# Patient Record
Sex: FEMALE | Race: WHITE | Employment: PART TIME | URBAN - METROPOLITAN AREA
[De-identification: names, ages, dates, MRNs, and addresses within clinical notes are randomized per-mention and may not be internally consistent; named-entity substitution may affect disease eponyms.]

---

## 2017-04-04 PROCEDURE — 82607 VITAMIN B-12: CPT | Performed by: INTERNAL MEDICINE

## 2017-05-11 ENCOUNTER — TELEPHONE (OUTPATIENT)
Dept: INTERNAL MEDICINE CLINIC | Facility: CLINIC | Age: 27
End: 2017-05-11

## 2017-05-12 NOTE — TELEPHONE ENCOUNTER
Lisa Lozada, do you know an answer to this?  I would think if he is, then she would be, but I dont want to make an assumption

## 2017-05-17 NOTE — TELEPHONE ENCOUNTER
Clint Kearns is a mid level and isn't a PROVIDER. DR. Carlso Friend is contracted with [facility]. We have requested that the vonnie change the billing provider to Dr. Carlos Friend and the service is [employer].     I am sending info in an email to be sure that we bill [facility]

## 2017-06-05 ENCOUNTER — TELEPHONE (OUTPATIENT)
Dept: FAMILY MEDICINE CLINIC | Facility: CLINIC | Age: 27
End: 2017-06-05

## 2017-06-09 ENCOUNTER — OFFICE VISIT (OUTPATIENT)
Dept: INTERNAL MEDICINE CLINIC | Facility: CLINIC | Age: 27
End: 2017-06-09

## 2017-06-09 VITALS
DIASTOLIC BLOOD PRESSURE: 80 MMHG | RESPIRATION RATE: 16 BRPM | SYSTOLIC BLOOD PRESSURE: 136 MMHG | WEIGHT: 254 LBS | HEART RATE: 76 BPM | HEIGHT: 66 IN | BODY MASS INDEX: 40.82 KG/M2

## 2017-06-09 DIAGNOSIS — Z51.81 ENCOUNTER FOR THERAPEUTIC DRUG MONITORING: Primary | ICD-10-CM

## 2017-06-09 DIAGNOSIS — E66.01 MORBID OBESITY WITH BMI OF 40.0-44.9, ADULT (HCC): ICD-10-CM

## 2017-06-09 PROCEDURE — 99213 OFFICE O/P EST LOW 20 MIN: CPT | Performed by: NURSE PRACTITIONER

## 2017-06-09 PROCEDURE — 93000 ELECTROCARDIOGRAM COMPLETE: CPT | Performed by: NURSE PRACTITIONER

## 2017-06-09 RX ORDER — LACTOBACIL 2/BIFIDO 1/S.THERMO 450B CELL
1 PACKET (EA) ORAL DAILY
Qty: 60 CAPSULE | Refills: 2 | Status: SHIPPED | COMMUNITY
Start: 2017-06-09

## 2017-06-09 RX ORDER — PHENTERMINE HYDROCHLORIDE 37.5 MG/1
37.5 TABLET ORAL
Qty: 30 TABLET | Refills: 0 | Status: SHIPPED | OUTPATIENT
Start: 2017-06-09 | End: 2017-07-11

## 2017-06-09 NOTE — PROGRESS NOTES
CC: Patient presents with:  Weight Problem: phentermine 2 years ago lost 11 pounds in 2 weeks then stopped to get pregnancy. exercise 3 days weekly currently       HPI:    Obesity, Feels that she has always been overweight and struggled with weight.  Had lo management of mother, antepartum     35 weeks gestation of pregnancy     Previous  delivery, delivered          Past Surgical History    TONSILLECTOMY        ,      Family History   Problem Relation Age of Onset   • Thyroid dis Prescriptions Disp Refills    Probiotic Product (VSL#3) Oral Cap 60 capsule 2      Sig: Take 1 capsule by mouth daily.       Phentermine HCl 37.5 MG Oral Tab 30 tablet 0      Sig: Take 1 tablet (37.5 mg total) by mouth every morning before breakfast.

## 2017-07-10 NOTE — ADDENDUM NOTE
Encounter addended by: Mekhi Reardon MA on: 7/10/2017  2:02 PM<BR>    Actions taken: Letter status changed

## 2017-07-11 ENCOUNTER — OFFICE VISIT (OUTPATIENT)
Dept: INTERNAL MEDICINE CLINIC | Facility: CLINIC | Age: 27
End: 2017-07-11

## 2017-07-11 VITALS
HEIGHT: 66 IN | DIASTOLIC BLOOD PRESSURE: 78 MMHG | SYSTOLIC BLOOD PRESSURE: 122 MMHG | RESPIRATION RATE: 16 BRPM | BODY MASS INDEX: 38.41 KG/M2 | HEART RATE: 80 BPM | WEIGHT: 239 LBS

## 2017-07-11 DIAGNOSIS — Z51.81 ENCOUNTER FOR THERAPEUTIC DRUG MONITORING: Primary | ICD-10-CM

## 2017-07-11 DIAGNOSIS — E66.01 MORBID OBESITY WITH BMI OF 40.0-44.9, ADULT (HCC): ICD-10-CM

## 2017-07-11 DIAGNOSIS — K59.03 DRUG-INDUCED CONSTIPATION: ICD-10-CM

## 2017-07-11 PROCEDURE — 99213 OFFICE O/P EST LOW 20 MIN: CPT | Performed by: NURSE PRACTITIONER

## 2017-07-11 RX ORDER — PHENTERMINE HYDROCHLORIDE 37.5 MG/1
37.5 TABLET ORAL
Qty: 30 TABLET | Refills: 0 | Status: SHIPPED | OUTPATIENT
Start: 2017-07-11 | End: 2017-08-08

## 2017-07-11 RX ORDER — LUBIPROSTONE 8 UG/1
8 CAPSULE, GELATIN COATED ORAL 2 TIMES DAILY WITH MEALS
Qty: 30 CAPSULE | Refills: 0 | COMMUNITY
Start: 2017-07-11 | End: 2018-05-01 | Stop reason: ALTCHOICE

## 2017-07-11 NOTE — PROGRESS NOTES
CC: Patient presents with:  Weight Check: lost 15 pounds       HPI:   Obesity. Patient tolerating phentermine but a lot of constipation associated with phentermine and she is taking VSL #3 and then was taking colace which did not really help.  Plans throat is clear, PERRLA  LUNGS: clear to auscultation bilat   CARDIO: RRR without murmur  GI: +BS's    No orders of the defined types were placed in this encounter.        Signed Prescriptions Disp Refills    Phentermine HCl 37.5 MG Oral Tab 30 tablet 0

## 2017-07-11 NOTE — PATIENT INSTRUCTIONS
1.  Keep up the good work  2. Reschedule with dietician and get labs done  3. Add metamucil daily for constipation,  If constipation continues try Miralax over the counter and make sure you are drinking lots of water and getting fiber in your diet.   Any ty · If you find yourself eating when you’re not hungry, ask yourself why. Many of us eat when we’re bored, stressed, or just to be polite. Listen to your body. If you’re not hungry, get busy doing something else instead of eating.   · Eat slower, shooting for

## 2017-07-31 PROCEDURE — 82397 CHEMILUMINESCENT ASSAY: CPT | Performed by: NURSE PRACTITIONER

## 2017-07-31 PROCEDURE — 36415 COLL VENOUS BLD VENIPUNCTURE: CPT | Performed by: NURSE PRACTITIONER

## 2017-08-08 ENCOUNTER — OFFICE VISIT (OUTPATIENT)
Dept: INTERNAL MEDICINE CLINIC | Facility: CLINIC | Age: 27
End: 2017-08-08

## 2017-08-08 VITALS
BODY MASS INDEX: 37.61 KG/M2 | HEART RATE: 88 BPM | RESPIRATION RATE: 16 BRPM | WEIGHT: 234 LBS | DIASTOLIC BLOOD PRESSURE: 70 MMHG | SYSTOLIC BLOOD PRESSURE: 110 MMHG | HEIGHT: 66 IN

## 2017-08-08 DIAGNOSIS — Z51.81 ENCOUNTER FOR THERAPEUTIC DRUG MONITORING: ICD-10-CM

## 2017-08-08 DIAGNOSIS — E66.01 MORBID OBESITY WITH BMI OF 40.0-44.9, ADULT (HCC): ICD-10-CM

## 2017-08-08 PROCEDURE — 99213 OFFICE O/P EST LOW 20 MIN: CPT | Performed by: NURSE PRACTITIONER

## 2017-08-08 RX ORDER — PHENTERMINE HYDROCHLORIDE 37.5 MG/1
37.5 TABLET ORAL
Qty: 30 TABLET | Refills: 0 | Status: SHIPPED | OUTPATIENT
Start: 2017-08-08 | End: 2017-09-05

## 2017-08-08 RX ORDER — TOPIRAMATE 25 MG/1
25 TABLET ORAL 2 TIMES DAILY
Qty: 60 TABLET | Refills: 1 | Status: SHIPPED | OUTPATIENT
Start: 2017-08-08 | End: 2017-10-10

## 2017-08-08 NOTE — PROGRESS NOTES
CC: Patient presents with:  Weight Check: lost 5 pounds       HPI:   Obesity. Patient tolerating phentermine better and metamucil and miralax has helped. She felt a bit more hungrier this month later in day.        Current Outpatient Prescriptions: RRR without murmur  GI: +BS's    No orders of the defined types were placed in this encounter.        Signed Prescriptions Disp Refills    Phentermine HCl 37.5 MG Oral Tab 30 tablet 0      Sig: Take 1 tablet (37.5 mg total) by mouth every morning before luna

## 2017-08-08 NOTE — PATIENT INSTRUCTIONS
Great job on exercise and is meeting with Rasta Greenberg RD      Weight Management: Fact and Fiction  Knowing the truth about losing weight can help you separate what works from what doesn’t.  Don’t be taken in by expensive weight-loss fads like pills, herbs, Fiction: “Low-fat and fat-free mean low-calorie.”  Fact: All foods, even fat-free ones, have calories. Eat too many calories and you’ll gain weight. It’s OK to treat yourself to a fat-free cookie or two. Just don’t eat the whole box!  A dietitian will help

## 2017-08-17 ENCOUNTER — OFFICE VISIT (OUTPATIENT)
Dept: INTERNAL MEDICINE CLINIC | Facility: CLINIC | Age: 27
End: 2017-08-17

## 2017-08-17 DIAGNOSIS — E66.9 OBESITY, CLASS II, BMI 35-39.9: ICD-10-CM

## 2017-08-17 PROCEDURE — 97802 MEDICAL NUTRITION INDIV IN: CPT | Performed by: DIETITIAN, REGISTERED

## 2017-08-20 VITALS — BODY MASS INDEX: 37 KG/M2 | WEIGHT: 232 LBS

## 2017-08-20 NOTE — PROGRESS NOTES
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   ----------    AST   Date Value Ref Range Status   04/04/2017 12 (L) 15 - 41 U/L Final   ----------    ALT   Date Value Ref Range Status   04/0 as bread and pasta. She had eaten in the past several months and noted rapid weight gain. The role of carbohydrate in foods replenishing muscle glycogen, with resulting water weight gain was discussed.    Emphasis was put on the goal of weight loss is to

## 2017-09-05 ENCOUNTER — OFFICE VISIT (OUTPATIENT)
Dept: INTERNAL MEDICINE CLINIC | Facility: CLINIC | Age: 27
End: 2017-09-05

## 2017-09-05 VITALS
SYSTOLIC BLOOD PRESSURE: 120 MMHG | DIASTOLIC BLOOD PRESSURE: 78 MMHG | HEART RATE: 80 BPM | WEIGHT: 224 LBS | HEIGHT: 66 IN | BODY MASS INDEX: 36 KG/M2 | RESPIRATION RATE: 16 BRPM

## 2017-09-05 DIAGNOSIS — E66.01 MORBID OBESITY WITH BMI OF 40.0-44.9, ADULT (HCC): ICD-10-CM

## 2017-09-05 DIAGNOSIS — Z51.81 ENCOUNTER FOR THERAPEUTIC DRUG MONITORING: ICD-10-CM

## 2017-09-05 PROCEDURE — 99213 OFFICE O/P EST LOW 20 MIN: CPT | Performed by: NURSE PRACTITIONER

## 2017-09-05 RX ORDER — PHENTERMINE HYDROCHLORIDE 37.5 MG/1
37.5 TABLET ORAL
Qty: 30 TABLET | Refills: 0 | Status: SHIPPED | OUTPATIENT
Start: 2017-09-05 | End: 2017-10-10

## 2017-09-05 NOTE — PATIENT INSTRUCTIONS
Weight Management: Overcoming Your Barriers  You may have many reasons why you’re not ready to lose weight. You may not feel you have the time or the skills. You may be afraid of losing weight and gaining it back again. Well, you can lose weight.  And y healthcare provider or dietitian about methods to lose weight that are safe for you. For example, even if you have severe arthritis, it may be easier for you to exercise in a pool.  Get advice from a .    Date Last Reviewed: 2/2/2016  ©

## 2017-09-05 NOTE — PROGRESS NOTES
CC: Patient presents with:  Weight Check: lost 10 pounds       HPI:   Obesity. Patient tolerating phentermine and addition of topamax has really helped with cravings. She has also begun to exercise more regularly.        Current Outpatient Prescript orders of the defined types were placed in this encounter.        Signed Prescriptions Disp Refills    Phentermine HCl 37.5 MG Oral Tab 30 tablet 0      Sig: Take 1 tablet (37.5 mg total) by mouth every morning before breakfast.          None     ASSESSMENT

## 2017-10-10 ENCOUNTER — OFFICE VISIT (OUTPATIENT)
Dept: INTERNAL MEDICINE CLINIC | Facility: CLINIC | Age: 27
End: 2017-10-10

## 2017-10-10 VITALS
DIASTOLIC BLOOD PRESSURE: 74 MMHG | BODY MASS INDEX: 34.87 KG/M2 | SYSTOLIC BLOOD PRESSURE: 132 MMHG | WEIGHT: 217 LBS | HEIGHT: 66 IN | RESPIRATION RATE: 16 BRPM | HEART RATE: 88 BPM

## 2017-10-10 DIAGNOSIS — Z51.81 ENCOUNTER FOR THERAPEUTIC DRUG MONITORING: ICD-10-CM

## 2017-10-10 DIAGNOSIS — E66.01 MORBID OBESITY WITH BMI OF 40.0-44.9, ADULT (HCC): ICD-10-CM

## 2017-10-10 PROCEDURE — 99213 OFFICE O/P EST LOW 20 MIN: CPT | Performed by: NURSE PRACTITIONER

## 2017-10-10 RX ORDER — PHENTERMINE HYDROCHLORIDE 37.5 MG/1
37.5 TABLET ORAL
Qty: 30 TABLET | Refills: 0 | Status: SHIPPED | OUTPATIENT
Start: 2017-10-10 | End: 2017-11-09

## 2017-10-10 RX ORDER — TOPIRAMATE 25 MG/1
25 TABLET ORAL 2 TIMES DAILY
Qty: 60 TABLET | Refills: 2 | Status: SHIPPED | OUTPATIENT
Start: 2017-10-10 | End: 2017-12-07

## 2017-10-10 NOTE — PATIENT INSTRUCTIONS
Healthy Tips for Eating Out  You don't have to give up eating out to cut down on fat, cholesterol, and salt. You just need to think about what you order. Many menus highlight low-fat and low-sodium dishes. But if you can't find what you want, ask.  Darryle Bowers

## 2017-10-10 NOTE — PROGRESS NOTES
CC: Patient presents with:  Weight Check: lost 7 pounds       HPI:   Obesity. Patient tolerating phentermine and  topamax without any problems and good appetite control. She continues to exercise more regularly.        Current Outpatient Prescriptio murmur  GI: +BS's    No orders of the defined types were placed in this encounter.        Signed Prescriptions Disp Refills    Phentermine HCl 37.5 MG Oral Tab 30 tablet 0      Sig: Take 1 tablet (37.5 mg total) by mouth every morning before breakfast.

## 2017-11-09 ENCOUNTER — OFFICE VISIT (OUTPATIENT)
Dept: INTERNAL MEDICINE CLINIC | Facility: CLINIC | Age: 27
End: 2017-11-09

## 2017-11-09 VITALS
HEIGHT: 66 IN | WEIGHT: 212 LBS | SYSTOLIC BLOOD PRESSURE: 102 MMHG | HEART RATE: 80 BPM | BODY MASS INDEX: 34.07 KG/M2 | RESPIRATION RATE: 16 BRPM | DIASTOLIC BLOOD PRESSURE: 80 MMHG

## 2017-11-09 DIAGNOSIS — Z51.81 ENCOUNTER FOR THERAPEUTIC DRUG MONITORING: ICD-10-CM

## 2017-11-09 DIAGNOSIS — E66.01 MORBID OBESITY WITH BMI OF 40.0-44.9, ADULT (HCC): ICD-10-CM

## 2017-11-09 PROCEDURE — 99213 OFFICE O/P EST LOW 20 MIN: CPT | Performed by: NURSE PRACTITIONER

## 2017-11-09 RX ORDER — PHENTERMINE HYDROCHLORIDE 37.5 MG/1
37.5 TABLET ORAL
Qty: 30 TABLET | Refills: 0 | Status: SHIPPED | OUTPATIENT
Start: 2017-11-09 | End: 2017-12-07

## 2017-11-09 NOTE — PROGRESS NOTES
CC: Patient presents with:  Weight Check: lost 6 pounds       HPI:   Obesity. Patient tolerating phentermine and topamax without any problems and good appetite control.         Current Outpatient Prescriptions:  Phentermine HCl 37.5 MG Oral Tab Take in this encounter.        Signed Prescriptions Disp Refills    Phentermine HCl 37.5 MG Oral Tab 30 tablet 0      Sig: Take 1 tablet (37.5 mg total) by mouth every morning before breakfast.          None     ASSESSMENT:   Encounter for therapeutic drug monit

## 2017-11-09 NOTE — PATIENT INSTRUCTIONS
Diabetes: Learning About Serving and Portion Sizes     A good rule of thumb: Devote half your plate to vegetables and green salad. Split the other half between protein and starchy carbohydrates. Fruit makes a good dessert. Servings and portions.  What When you’re planning for a snack or a meal, keep servings in mind. If you don’t have measuring cups or a scale handy, there are ways to [facility] serving sizes, such as comparing your food to the size of your hand (see pictures above).   Managing portion si

## 2017-12-07 ENCOUNTER — OFFICE VISIT (OUTPATIENT)
Dept: INTERNAL MEDICINE CLINIC | Facility: CLINIC | Age: 27
End: 2017-12-07

## 2017-12-07 VITALS
HEART RATE: 80 BPM | RESPIRATION RATE: 16 BRPM | HEIGHT: 66 IN | SYSTOLIC BLOOD PRESSURE: 112 MMHG | BODY MASS INDEX: 33.27 KG/M2 | DIASTOLIC BLOOD PRESSURE: 70 MMHG | WEIGHT: 207 LBS

## 2017-12-07 DIAGNOSIS — E66.01 MORBID OBESITY WITH BMI OF 40.0-44.9, ADULT (HCC): ICD-10-CM

## 2017-12-07 DIAGNOSIS — Z51.81 ENCOUNTER FOR THERAPEUTIC DRUG MONITORING: ICD-10-CM

## 2017-12-07 PROCEDURE — 99213 OFFICE O/P EST LOW 20 MIN: CPT | Performed by: NURSE PRACTITIONER

## 2017-12-07 RX ORDER — PHENTERMINE HYDROCHLORIDE 37.5 MG/1
37.5 TABLET ORAL
Qty: 30 TABLET | Refills: 0 | Status: SHIPPED | OUTPATIENT
Start: 2017-12-07 | End: 2018-01-06

## 2017-12-07 RX ORDER — TOPIRAMATE 25 MG/1
25 TABLET ORAL 2 TIMES DAILY
Qty: 60 TABLET | Refills: 2 | Status: SHIPPED | OUTPATIENT
Start: 2017-12-07 | End: 2018-03-06

## 2017-12-07 NOTE — PROGRESS NOTES
CC: Patient presents with:  Weight Check: lost 5 pounds       HPI:   Obesity. Patient doing fine on phentermine and topamax without any problems and good appetite control.  She has not been exercising as much as preparing for [place] as hosting at encounter.        Signed Prescriptions Disp Refills    Phentermine HCl 37.5 MG Oral Tab 30 tablet 0      Sig: Take 1 tablet (37.5 mg total) by mouth every morning before breakfast.      topiramate 25 MG Oral Tab 60 tablet 2      Sig: Take 1 tablet (25 mg to

## 2017-12-07 NOTE — PATIENT INSTRUCTIONS
Weight Management: Take It Off and Keep It Off    It’s easy to be motivated when you first start. The key is to stay motivated all along the way and to have realistic and achievable goals.  There are things you can do to keep yourself on the path to succe · Make a list of the things that others like about you and that you like about yourself. Add something new from time to time. Keep this list to look at when you need a lift. Resources  · [employer] Nutritionwww. fitness. go

## 2018-02-01 ENCOUNTER — OFFICE VISIT (OUTPATIENT)
Dept: INTERNAL MEDICINE CLINIC | Facility: CLINIC | Age: 28
End: 2018-02-01

## 2018-02-01 VITALS
DIASTOLIC BLOOD PRESSURE: 78 MMHG | SYSTOLIC BLOOD PRESSURE: 122 MMHG | WEIGHT: 201 LBS | HEART RATE: 80 BPM | HEIGHT: 66 IN | RESPIRATION RATE: 16 BRPM | BODY MASS INDEX: 32.3 KG/M2

## 2018-02-01 DIAGNOSIS — E66.9 CLASS 1 OBESITY WITHOUT SERIOUS COMORBIDITY WITH BODY MASS INDEX (BMI) OF 32.0 TO 32.9 IN ADULT, UNSPECIFIED OBESITY TYPE: ICD-10-CM

## 2018-02-01 DIAGNOSIS — Z51.81 THERAPEUTIC DRUG MONITORING: Primary | ICD-10-CM

## 2018-02-01 PROCEDURE — 99213 OFFICE O/P EST LOW 20 MIN: CPT | Performed by: INTERNAL MEDICINE

## 2018-02-01 RX ORDER — PHENTERMINE HYDROCHLORIDE 37.5 MG/1
37.5 TABLET ORAL
COMMUNITY
End: 2018-02-01

## 2018-02-01 RX ORDER — PHENTERMINE HYDROCHLORIDE 37.5 MG/1
37.5 TABLET ORAL
Qty: 30 TABLET | Refills: 0 | Status: SHIPPED | OUTPATIENT
Start: 2018-02-01 | End: 2018-04-03

## 2018-02-01 NOTE — PROGRESS NOTES
Jordan Blakely is a 32year old female.     Chief complaint:weight loss follow up     HPI:   Perlie Claude here for follow up on weight loss   Wt Readings from Last 6 Encounters:  02/01/18 : 201 lb  12/07/17 : 207 lb  11/09/17 : 212 lb  10/10/17 : 217 lb  09/05 Alcohol use: Yes           0.0 oz/week     Comment: glass of wine a week        Family History   Problem Relation Age of Onset   • Thyroid disease Father    • Hypertension Father    • Breast Cancer Mother 64   • Pacemaker Maternal Grandmother    • Heart Medication History:  Continue half phentermine 37.5 mg daily continue topamax   Plan:  1. Nutrition: low carb diet   2. Referral RD/nutritionist   3. FITTE:  ACSM recommendations (150 -200 minutes/week in active weight loss)  4.  Behavior:  Motivational i

## 2018-03-06 DIAGNOSIS — E66.01 MORBID OBESITY WITH BMI OF 40.0-44.9, ADULT (HCC): ICD-10-CM

## 2018-03-06 DIAGNOSIS — Z51.81 ENCOUNTER FOR THERAPEUTIC DRUG MONITORING: ICD-10-CM

## 2018-03-06 RX ORDER — TOPIRAMATE 25 MG/1
25 TABLET ORAL 2 TIMES DAILY
Qty: 180 TABLET | Refills: 0 | Status: SHIPPED | OUTPATIENT
Start: 2018-03-06 | End: 2018-05-09

## 2018-03-06 NOTE — TELEPHONE ENCOUNTER
Megan patient    Requesting 90 day supply.  pending    Requesting topamax  LOV: 12/7/17  RTC: 4 weeks  Filled: 12/7/17 #60 with 2 refills    Future Appointments  Date Time Provider Gal Rodríguez   3/8/2018 11:40 AM [facility]   4/3

## 2018-04-03 ENCOUNTER — OFFICE VISIT (OUTPATIENT)
Dept: INTERNAL MEDICINE CLINIC | Facility: CLINIC | Age: 28
End: 2018-04-03

## 2018-04-03 VITALS
HEIGHT: 66 IN | HEART RATE: 84 BPM | RESPIRATION RATE: 16 BRPM | BODY MASS INDEX: 31.5 KG/M2 | SYSTOLIC BLOOD PRESSURE: 110 MMHG | DIASTOLIC BLOOD PRESSURE: 60 MMHG | WEIGHT: 196 LBS

## 2018-04-03 DIAGNOSIS — Z51.81 THERAPEUTIC DRUG MONITORING: Primary | ICD-10-CM

## 2018-04-03 DIAGNOSIS — E66.9 CLASS II OBESITY: ICD-10-CM

## 2018-04-03 PROCEDURE — 99213 OFFICE O/P EST LOW 20 MIN: CPT | Performed by: INTERNAL MEDICINE

## 2018-04-03 RX ORDER — PHENTERMINE HYDROCHLORIDE 37.5 MG/1
TABLET ORAL
Qty: 30 TABLET | Refills: 0 | Status: SHIPPED | OUTPATIENT
Start: 2018-04-03 | End: 2018-07-02

## 2018-04-03 RX ORDER — PHENTERMINE HYDROCHLORIDE 37.5 MG/1
37.5 TABLET ORAL
Qty: 30 TABLET | Refills: 0 | Status: SHIPPED | OUTPATIENT
Start: 2018-04-03 | End: 2018-05-09

## 2018-04-03 NOTE — PROGRESS NOTES
HISTORY OF PRESENT ILLNESS  Patient presents with:  Weight Check: down [address] [facility] Michael Bowers is a 32year old female here for follow up in medical weight loss program.     Denies chest pain, shortness of breath, dizziness, blurred vision, headache, pares Value Date   CHOLEST 162 04/04/2017   TRIG 103 04/04/2017   HDL 39 (L) 04/04/2017    04/04/2017       Lab Results  Component Value Date   TSH 1.990 04/04/2017       Lab Results  Component Value Date   B12 868 04/04/2017       Lab Results  Component fitnessblender and fitsugar   · Weight Loss Contract reviewed and signed     There are no Patient Instructions on file for this visit. Return in about 4 weeks (around 5/1/2018) for weight mangement. Patient verbalizes understanding.     Allegra Villa MD

## 2018-05-01 ENCOUNTER — HOSPITAL ENCOUNTER (OUTPATIENT)
Age: 28
Discharge: HOME | End: 2018-05-01
Payer: COMMERCIAL

## 2018-05-01 VITALS
RESPIRATION RATE: 18 BRPM | SYSTOLIC BLOOD PRESSURE: 139 MMHG | TEMPERATURE: 99 F | DIASTOLIC BLOOD PRESSURE: 88 MMHG | OXYGEN SATURATION: 100 % | HEART RATE: 100 BPM

## 2018-05-01 DIAGNOSIS — J06.9 UPPER RESPIRATORY TRACT INFECTION, UNSPECIFIED TYPE: Primary | ICD-10-CM

## 2018-05-01 PROCEDURE — 99204 OFFICE O/P NEW MOD 45 MIN: CPT

## 2018-05-01 PROCEDURE — 99214 OFFICE O/P EST MOD 30 MIN: CPT

## 2018-05-01 PROCEDURE — 94640 AIRWAY INHALATION TREATMENT: CPT

## 2018-05-01 RX ORDER — ALBUTEROL SULFATE 90 UG/1
2 AEROSOL, METERED RESPIRATORY (INHALATION) EVERY 4 HOURS PRN
Qty: 1 INHALER | Refills: 0 | Status: SHIPPED | OUTPATIENT
Start: 2018-05-01 | End: 2018-05-31

## 2018-05-01 RX ORDER — IPRATROPIUM BROMIDE AND ALBUTEROL SULFATE 2.5; .5 MG/3ML; MG/3ML
3 SOLUTION RESPIRATORY (INHALATION) ONCE
Status: COMPLETED | OUTPATIENT
Start: 2018-05-01 | End: 2018-05-01

## 2018-05-01 RX ORDER — PREDNISONE 20 MG/1
40 TABLET ORAL DAILY
Qty: 10 TABLET | Refills: 0 | Status: SHIPPED | OUTPATIENT
Start: 2018-05-01 | End: 2018-05-06

## 2018-05-01 RX ORDER — AZITHROMYCIN 250 MG/1
TABLET, FILM COATED ORAL
Qty: 1 PACKAGE | Refills: 0 | Status: SHIPPED | OUTPATIENT
Start: 2018-05-01 | End: 2018-05-06

## 2018-05-01 NOTE — ED INITIAL ASSESSMENT (HPI)
C/O cough with wheezing and chest congestion started today, coughing-up thick yellow mucous. Taking Mucinex,Sudafed, Tylenol and Motrin.

## 2018-05-02 NOTE — ED PROVIDER NOTES
Patient Seen in: [facility]    History   Patient presents with:  Cough/URI    Stated Complaint: COUGH     HPI    47-year-old female who comes in today complaining of worsening upper respiratory complaints cough and wheezing that canals normal, both ears   Nose: Nares symmetrical, septum midline, mucosa normal, clear watery discharge; no sinus tenderness   Throat: Lips, tongue, and mucosa are moist, pink, and intact; teeth intact.  Mild erythema, no exudates or tonsillar hypertrophy into the lungs every 4 (four) hours as needed for Wheezing. Qty: 1 Inhaler Refills: 0      I have given the patient instructions regarding her diagnosis, expectations, follow up, and return to the ER precautions.   I explained to the patient that emergent

## 2018-05-04 ENCOUNTER — PATIENT MESSAGE (OUTPATIENT)
Dept: INTERNAL MEDICINE CLINIC | Facility: CLINIC | Age: 28
End: 2018-05-04

## 2018-05-04 RX ORDER — PHENTERMINE HYDROCHLORIDE 37.5 MG/1
37.5 TABLET ORAL
Qty: 30 TABLET | Refills: 0
Start: 2018-05-04

## 2018-05-04 NOTE — TELEPHONE ENCOUNTER
From: Casey Shelton  To: Edmund Gonzalez MD  Sent: 5/4/2018 9:14 AM CDT  Subject: Prescription Question    Dr Isai Slaughter, April has been a bad month. I have been off my medicine for 2 weeks now due to a bad respiratory illness.  I complete my steroids and antibiot

## 2018-05-04 NOTE — TELEPHONE ENCOUNTER
From: Mima Babb  Sent: 5/4/2018 9:11 AM CDT  Subject: Medication Renewal Request    Mima Babb would like a refill of the following medications:     Phentermine HCl 37.5 MG Oral Tab Zofia Barnett MD]   Patient Comment: Dr Gonzalez Rodriguez, April has been a

## 2018-05-09 ENCOUNTER — OFFICE VISIT (OUTPATIENT)
Dept: INTERNAL MEDICINE CLINIC | Facility: CLINIC | Age: 28
End: 2018-05-09

## 2018-05-09 VITALS
BODY MASS INDEX: 31.44 KG/M2 | HEIGHT: 66 IN | HEART RATE: 80 BPM | RESPIRATION RATE: 12 BRPM | WEIGHT: 195.63 LBS | DIASTOLIC BLOOD PRESSURE: 70 MMHG | SYSTOLIC BLOOD PRESSURE: 130 MMHG

## 2018-05-09 DIAGNOSIS — Z51.81 ENCOUNTER FOR THERAPEUTIC DRUG MONITORING: ICD-10-CM

## 2018-05-09 DIAGNOSIS — E66.01 MORBID OBESITY WITH BMI OF 40.0-44.9, ADULT (HCC): ICD-10-CM

## 2018-05-09 PROCEDURE — 99213 OFFICE O/P EST LOW 20 MIN: CPT | Performed by: INTERNAL MEDICINE

## 2018-05-09 RX ORDER — PHENTERMINE HYDROCHLORIDE 37.5 MG/1
37.5 TABLET ORAL
Qty: 30 TABLET | Refills: 0 | Status: SHIPPED | OUTPATIENT
Start: 2018-05-09 | End: 2018-07-02

## 2018-05-09 RX ORDER — TOPIRAMATE 25 MG/1
25 TABLET ORAL 2 TIMES DAILY
Qty: 180 TABLET | Refills: 0 | Status: SHIPPED | OUTPATIENT
Start: 2018-05-09 | End: 2018-08-02

## 2018-05-09 RX ORDER — PHENTERMINE HYDROCHLORIDE 37.5 MG/1
TABLET ORAL
Qty: 30 TABLET | Refills: 0 | Status: CANCELLED | OUTPATIENT
Start: 2018-05-09

## 2018-06-02 ENCOUNTER — PATIENT MESSAGE (OUTPATIENT)
Dept: INTERNAL MEDICINE CLINIC | Facility: CLINIC | Age: 28
End: 2018-06-02

## 2018-06-05 NOTE — TELEPHONE ENCOUNTER
From: Casey Shelton  To: Edmund Gonzalez MD  Sent: 6/2/2018 11:06 PM CDT  Subject: Visit Follow-up Question    Dr. Isai Slaughter, I had to cancel my Monday appointment due to an unexpected medical issue with my son, I apologize for the late cancellation.  The month i

## 2018-06-12 RX ORDER — TOPIRAMATE 25 MG/1
25 TABLET ORAL 2 TIMES DAILY
Qty: 180 TABLET | Refills: 0 | Status: SHIPPED | OUTPATIENT
Start: 2018-06-12 | End: 2018-07-02

## 2018-07-02 ENCOUNTER — OFFICE VISIT (OUTPATIENT)
Dept: INTERNAL MEDICINE CLINIC | Facility: CLINIC | Age: 28
End: 2018-07-02

## 2018-07-02 VITALS
HEIGHT: 66 IN | DIASTOLIC BLOOD PRESSURE: 70 MMHG | HEART RATE: 76 BPM | RESPIRATION RATE: 16 BRPM | SYSTOLIC BLOOD PRESSURE: 110 MMHG | BODY MASS INDEX: 30.7 KG/M2 | WEIGHT: 191 LBS

## 2018-07-02 DIAGNOSIS — E66.01 MORBID OBESITY WITH BMI OF 40.0-44.9, ADULT (HCC): ICD-10-CM

## 2018-07-02 DIAGNOSIS — Z51.81 THERAPEUTIC DRUG MONITORING: Primary | ICD-10-CM

## 2018-07-02 PROCEDURE — 99213 OFFICE O/P EST LOW 20 MIN: CPT | Performed by: INTERNAL MEDICINE

## 2018-07-02 RX ORDER — PHENTERMINE HYDROCHLORIDE 37.5 MG/1
37.5 TABLET ORAL
Qty: 30 TABLET | Refills: 0 | Status: SHIPPED | OUTPATIENT
Start: 2018-07-02 | End: 2018-08-02

## 2018-07-05 NOTE — PROGRESS NOTES
HISTORY OF PRESENT ILLNESS  Patient presents with:  Weight Check: down [address] David Goel is a 25year old female here for follow up in medical weight loss program.   Down 4 lbs this month  Feels like did much better job about being on track with her 1.990 04/04/2017       Lab Results  Component Value Date   B12 868 04/04/2017       Lab Results  Component Value Date   VITD 35.82 07/31/2017         Current Outpatient Prescriptions on File Prior to Visit:  topiramate 25 MG Oral Tab Take 1 tablet (25 mg t mangement. Patient verbalizes understanding.     Lacho Cody MD  4/3/2018

## 2018-08-02 ENCOUNTER — OFFICE VISIT (OUTPATIENT)
Dept: INTERNAL MEDICINE CLINIC | Facility: CLINIC | Age: 28
End: 2018-08-02
Payer: COMMERCIAL

## 2018-08-02 VITALS
HEIGHT: 66 IN | WEIGHT: 187 LBS | BODY MASS INDEX: 30.05 KG/M2 | HEART RATE: 80 BPM | RESPIRATION RATE: 16 BRPM | SYSTOLIC BLOOD PRESSURE: 122 MMHG | DIASTOLIC BLOOD PRESSURE: 64 MMHG

## 2018-08-02 DIAGNOSIS — Z51.81 ENCOUNTER FOR THERAPEUTIC DRUG MONITORING: ICD-10-CM

## 2018-08-02 DIAGNOSIS — Z51.81 THERAPEUTIC DRUG MONITORING: Primary | ICD-10-CM

## 2018-08-02 DIAGNOSIS — E66.01 MORBID OBESITY WITH BMI OF 40.0-44.9, ADULT (HCC): ICD-10-CM

## 2018-08-02 PROCEDURE — 99213 OFFICE O/P EST LOW 20 MIN: CPT | Performed by: INTERNAL MEDICINE

## 2018-08-02 RX ORDER — PHENTERMINE HYDROCHLORIDE 37.5 MG/1
37.5 TABLET ORAL
Qty: 30 TABLET | Refills: 0 | Status: SHIPPED | OUTPATIENT
Start: 2018-08-02 | End: 2018-09-04

## 2018-08-02 RX ORDER — TOPIRAMATE 25 MG/1
25 TABLET ORAL 2 TIMES DAILY
Qty: 180 TABLET | Refills: 0 | Status: SHIPPED | OUTPATIENT
Start: 2018-08-02 | End: 2018-09-04

## 2018-08-02 NOTE — PROGRESS NOTES
HISTORY OF PRESENT ILLNESS  Patient presents with:  Weight Check: down 4 lb      Jordan Blakely is a 25year old female here for follow up in medical weight loss program.   Down 4 lbs this month  Better month for health consistency   Denies any chest pam Results  Component Value Date   TSH 1.990 04/04/2017       Lab Results  Component Value Date   B12 868 04/04/2017       Lab Results  Component Value Date   VITD 35.82 07/31/2017         Current Outpatient Prescriptions on File Prior to Visit:  Probiotic Pr file for this visit. Return in about 4 weeks (around 8/30/2018) for weight management. Patient verbalizes understanding.     Falguni Montiel MD  4/3/2018

## 2018-09-04 ENCOUNTER — OFFICE VISIT (OUTPATIENT)
Dept: INTERNAL MEDICINE CLINIC | Facility: CLINIC | Age: 28
End: 2018-09-04

## 2018-09-04 VITALS
HEART RATE: 72 BPM | BODY MASS INDEX: 29.51 KG/M2 | SYSTOLIC BLOOD PRESSURE: 106 MMHG | RESPIRATION RATE: 16 BRPM | WEIGHT: 183.63 LBS | DIASTOLIC BLOOD PRESSURE: 80 MMHG | HEIGHT: 66 IN | TEMPERATURE: 98 F

## 2018-09-04 DIAGNOSIS — E66.01 MORBID OBESITY WITH BMI OF 40.0-44.9, ADULT (HCC): ICD-10-CM

## 2018-09-04 DIAGNOSIS — Z51.81 THERAPEUTIC DRUG MONITORING: Primary | ICD-10-CM

## 2018-09-04 PROCEDURE — 99213 OFFICE O/P EST LOW 20 MIN: CPT | Performed by: INTERNAL MEDICINE

## 2018-09-04 RX ORDER — PHENTERMINE HYDROCHLORIDE 37.5 MG/1
37.5 TABLET ORAL
Qty: 30 TABLET | Refills: 0 | Status: SHIPPED | OUTPATIENT
Start: 2018-09-04 | End: 2018-10-04

## 2018-09-04 NOTE — PROGRESS NOTES
HISTORY OF PRESENT ILLNESS  Patient presents with:  Weight Check: 4 lb weight loss - pt. stopped taking topamax due to rashes       Vibha Ellsworth is a 25year old female here for follow up in medical weight loss program.   Down 4 lbs this month  Better 04/04/2017   ALT 16 04/04/2017   BILT 0.58 04/04/2017   TP 7.2 04/04/2017   ALB 3.9 04/04/2017    04/04/2017   K 4.2 04/04/2017    04/04/2017   CO2 26.3 04/04/2017     No results found for: EAG, A1C    Lab Results  Component Value Date   CHOLES maintenance  · FITTE: ACSM recommendations (150-300 minutes/ week in active weight loss)  · Weight Loss Contract reviewed and signed     There are no Patient Instructions on file for this visit. No Follow-up on file. Patient verbalizes understanding.

## 2018-10-02 ENCOUNTER — TELEPHONE (OUTPATIENT)
Dept: INTERNAL MEDICINE CLINIC | Facility: CLINIC | Age: 28
End: 2018-10-02

## 2018-10-02 NOTE — TELEPHONE ENCOUNTER
Patient called back to r/s her appt that was booked for 10/10 pt booked for 10/29 but will be out of medication pt states she may also have to cancel the new appt made for 10/29 due to having to  her child from school pt will let us know     Pt adde

## 2018-10-04 RX ORDER — PHENTERMINE HYDROCHLORIDE 37.5 MG/1
37.5 TABLET ORAL
Qty: 30 TABLET | Refills: 0 | Status: SHIPPED | OUTPATIENT
Start: 2018-10-04 | End: 2019-01-14

## 2018-10-04 NOTE — TELEPHONE ENCOUNTER
Pt called to check status on rx refill   Offered pt appt for today - pt declined has to  child from school     Pt on list

## 2018-10-04 NOTE — TELEPHONE ENCOUNTER
Pt was scheduled to see you on 10/29 at 12;20 but that time will not work as she has to  her son, she said anytime after 1 pm will work out best. She said she did not cancel the appt on 10/10 our office did and then scheduled her for 10/29.  rx fax

## 2018-10-04 NOTE — TELEPHONE ENCOUNTER
Called and left message for pt to call back. Need to know which pharmacy she wants Rx for Phentermine to go to.

## 2018-10-04 NOTE — TELEPHONE ENCOUNTER
Requesting phentermine 37.5  LOV: 9/4/18  RTC: 1 mth  Last Relevant Labs:   Filled: 9/4/18 #30 with 0 refills    Future Appointments   Date Time Provider Gal Rodríguez   10/29/2018 12:20 PM John Lopez MD [facility]     Pt cancelled appt 10/10

## 2018-10-16 ENCOUNTER — TELEPHONE (OUTPATIENT)
Dept: INTERNAL MEDICINE CLINIC | Facility: CLINIC | Age: 28
End: 2018-10-16

## 2018-10-16 NOTE — TELEPHONE ENCOUNTER
Pt on waitlist called 3 times left vm has not returned call declined first offer on first call for oct 2

## 2018-11-15 ENCOUNTER — OFFICE VISIT (OUTPATIENT)
Dept: INTERNAL MEDICINE CLINIC | Facility: CLINIC | Age: 28
End: 2018-11-15
Payer: COMMERCIAL

## 2018-11-15 VITALS
HEART RATE: 88 BPM | HEIGHT: 66 IN | RESPIRATION RATE: 16 BRPM | WEIGHT: 183 LBS | DIASTOLIC BLOOD PRESSURE: 76 MMHG | SYSTOLIC BLOOD PRESSURE: 126 MMHG | BODY MASS INDEX: 29.41 KG/M2

## 2018-11-15 DIAGNOSIS — Z51.81 ENCOUNTER FOR THERAPEUTIC DRUG MONITORING: Primary | ICD-10-CM

## 2018-11-15 DIAGNOSIS — E66.01 MORBID OBESITY WITH BMI OF 40.0-44.9, ADULT (HCC): ICD-10-CM

## 2018-11-15 PROCEDURE — 99213 OFFICE O/P EST LOW 20 MIN: CPT | Performed by: INTERNAL MEDICINE

## 2018-11-15 RX ORDER — PHENTERMINE HYDROCHLORIDE 15 MG/1
15 CAPSULE ORAL EVERY MORNING
Qty: 30 CAPSULE | Refills: 1 | Status: SHIPPED | OUTPATIENT
Start: 2018-11-15 | End: 2019-02-11

## 2018-11-15 RX ORDER — PHENTERMINE HYDROCHLORIDE 37.5 MG/1
37.5 TABLET ORAL
Qty: 30 TABLET | Refills: 0 | Status: CANCELLED | OUTPATIENT
Start: 2018-11-15

## 2018-11-15 NOTE — PROGRESS NOTES
HISTORY OF PRESENT ILLNESS  Patient presents with:  Weight Check: no weight change      Teresa Turner is a 25year old female here for follow up in medical weight loss program.   Down 4 lbs this month  Better month for health consistency   Denies any ch 04/04/2017     No results found for: EAG, A1C  Lab Results   Component Value Date    CHOLEST 162 04/04/2017    TRIG 103 04/04/2017    HDL 39 (L) 04/04/2017     04/04/2017     Lab Results   Component Value Date    TSH 1.990 04/04/2017     Lab Results recommendations (150-300 minutes/ week in active weight loss)  · Weight Loss Contract reviewed and signed     There are no Patient Instructions on file for this visit. Return in about 4 weeks (around 12/13/2018). Patient verbalizes understanding.

## 2019-01-14 ENCOUNTER — OFFICE VISIT (OUTPATIENT)
Dept: INTERNAL MEDICINE CLINIC | Facility: CLINIC | Age: 29
End: 2019-01-14
Payer: COMMERCIAL

## 2019-01-14 VITALS
SYSTOLIC BLOOD PRESSURE: 122 MMHG | BODY MASS INDEX: 30.05 KG/M2 | DIASTOLIC BLOOD PRESSURE: 76 MMHG | RESPIRATION RATE: 16 BRPM | WEIGHT: 187 LBS | HEART RATE: 78 BPM | HEIGHT: 66 IN

## 2019-01-14 DIAGNOSIS — Z51.81 ENCOUNTER FOR THERAPEUTIC DRUG MONITORING: Primary | ICD-10-CM

## 2019-01-14 DIAGNOSIS — E66.01 MORBID OBESITY WITH BMI OF 40.0-44.9, ADULT (HCC): ICD-10-CM

## 2019-01-14 PROCEDURE — 99213 OFFICE O/P EST LOW 20 MIN: CPT | Performed by: INTERNAL MEDICINE

## 2019-01-14 RX ORDER — PHENTERMINE HYDROCHLORIDE 37.5 MG/1
37.5 TABLET ORAL
Qty: 30 TABLET | Refills: 0 | Status: SHIPPED | OUTPATIENT
Start: 2019-01-14 | End: 2019-02-11

## 2019-01-14 NOTE — PROGRESS NOTES
HISTORY OF PRESENT ILLNESS  Patient presents with:  Weight Check: up 4 lb      Maribell Soco is a 25year old female here for follow up in medical weight loss program.   Up 4 lbs this month  Better month for health consistency   Denies any chest pain or 04/04/2017     04/04/2017     Lab Results   Component Value Date    TSH 1.990 04/04/2017     Lab Results   Component Value Date    B12 868 04/04/2017     Lab Results   Component Value Date    VITD 35.82 07/31/2017         Current Outpatient Jw Albrecht visit. Return in about 4 weeks (around 2/11/2019) for weight management. Patient verbalizes understanding.     Jennifer Mayberry MD  4/3/2018

## 2019-02-06 ENCOUNTER — TELEPHONE (OUTPATIENT)
Dept: INTERNAL MEDICINE CLINIC | Facility: CLINIC | Age: 29
End: 2019-02-06

## 2019-02-06 DIAGNOSIS — E66.01 MORBID OBESITY WITH BMI OF 40.0-44.9, ADULT (HCC): ICD-10-CM

## 2019-02-06 DIAGNOSIS — Z51.81 ENCOUNTER FOR THERAPEUTIC DRUG MONITORING: ICD-10-CM

## 2019-02-06 RX ORDER — TOPIRAMATE 25 MG/1
25 TABLET ORAL 2 TIMES DAILY
Qty: 180 TABLET | Refills: 0 | OUTPATIENT
Start: 2019-02-06 | End: 2019-05-07

## 2019-02-06 NOTE — TELEPHONE ENCOUNTER
Requesting Topiramate  LOV: 1/14/19  RTC: 4 weeks  Last Relevant Labs: n/a  Filled: 8/2/18 #180 with 0 refills    Future Appointments   Date Time Provider Gal Rodríguez   2/11/2019  1:00 PM Clay Mccullough MD [facility]     Pts topamax was stopped

## 2019-02-11 ENCOUNTER — OFFICE VISIT (OUTPATIENT)
Dept: INTERNAL MEDICINE CLINIC | Facility: CLINIC | Age: 29
End: 2019-02-11
Payer: COMMERCIAL

## 2019-02-11 VITALS
BODY MASS INDEX: 29.41 KG/M2 | HEIGHT: 66 IN | SYSTOLIC BLOOD PRESSURE: 118 MMHG | RESPIRATION RATE: 16 BRPM | WEIGHT: 183 LBS | DIASTOLIC BLOOD PRESSURE: 70 MMHG | HEART RATE: 78 BPM

## 2019-02-11 DIAGNOSIS — Z51.81 ENCOUNTER FOR THERAPEUTIC DRUG MONITORING: Primary | ICD-10-CM

## 2019-02-11 DIAGNOSIS — E66.01 MORBID OBESITY WITH BMI OF 40.0-44.9, ADULT (HCC): ICD-10-CM

## 2019-02-11 PROCEDURE — 99213 OFFICE O/P EST LOW 20 MIN: CPT | Performed by: INTERNAL MEDICINE

## 2019-02-11 RX ORDER — TOPIRAMATE 25 MG/1
25 TABLET ORAL 2 TIMES DAILY
Refills: 0 | COMMUNITY
Start: 2018-11-01 | End: 2019-02-11

## 2019-02-11 RX ORDER — PHENTERMINE HYDROCHLORIDE 37.5 MG/1
37.5 TABLET ORAL
Qty: 30 TABLET | Refills: 0 | Status: SHIPPED | OUTPATIENT
Start: 2019-02-11 | End: 2019-03-25

## 2019-02-11 NOTE — PROGRESS NOTES
HISTORY OF PRESENT ILLNESS  Patient presents with:  Weight Check: down 4 lb      Chantal Ryan is a 25year old female here for follow up in medical weight loss program.   Down 4 lb from previous   [place] that she was making good decisions with her eating TRIG 103 04/04/2017    HDL 39 (L) 04/04/2017     04/04/2017     Lab Results   Component Value Date    TSH 1.990 04/04/2017     Lab Results   Component Value Date    B12 868 04/04/2017     Lab Results   Component Value Date    VITD 35.82 07/31/2017 MD  4/3/2018

## 2019-03-25 ENCOUNTER — OFFICE VISIT (OUTPATIENT)
Dept: INTERNAL MEDICINE CLINIC | Facility: CLINIC | Age: 29
End: 2019-03-25
Payer: COMMERCIAL

## 2019-03-25 VITALS
SYSTOLIC BLOOD PRESSURE: 118 MMHG | HEART RATE: 80 BPM | WEIGHT: 179 LBS | DIASTOLIC BLOOD PRESSURE: 76 MMHG | RESPIRATION RATE: 16 BRPM | HEIGHT: 66 IN | BODY MASS INDEX: 28.77 KG/M2

## 2019-03-25 DIAGNOSIS — Z51.81 ENCOUNTER FOR THERAPEUTIC DRUG MONITORING: Primary | ICD-10-CM

## 2019-03-25 DIAGNOSIS — E66.01 MORBID OBESITY WITH BMI OF 40.0-44.9, ADULT (HCC): ICD-10-CM

## 2019-03-25 DIAGNOSIS — Z71.82 EXERCISE COUNSELING: ICD-10-CM

## 2019-03-25 PROCEDURE — 99213 OFFICE O/P EST LOW 20 MIN: CPT | Performed by: INTERNAL MEDICINE

## 2019-03-25 RX ORDER — PHENTERMINE HYDROCHLORIDE 37.5 MG/1
37.5 TABLET ORAL
Qty: 30 TABLET | Refills: 0 | Status: SHIPPED | OUTPATIENT
Start: 2019-03-25 | End: 2019-05-06

## 2019-03-25 NOTE — PROGRESS NOTES
HISTORY OF PRESENT ILLNESS  Patient presents with:  Weight Check: down 4 lb      Harrison Thornton is a 25year old female here for follow up in medical weight loss program.   Down 4 lb from previous   [place] that she was making good decisions with her eating 04/04/2017     04/04/2017     Lab Results   Component Value Date    TSH 1.990 04/04/2017     Lab Results   Component Value Date    B12 868 04/04/2017     Lab Results   Component Value Date    VITD 35.82 07/31/2017         Current Outpatient Shyla Kingston (around 4/22/2019) for weight management. Patient verbalizes understanding.     Brandt Pimentel MD  4/3/2018

## 2019-03-25 NOTE — PROGRESS NOTES
Preventative Counseling for Diet and Exercise     /76   Pulse 80   Resp 16   Ht 66\"   Wt 179 lb   LMP 03/18/2019   BMI 28.89 kg/m²   Body mass index is 28.89 kg/m².     Wt Readings from Last 6 Encounters:  03/25/19 : 179 lb  02/11/19 : 183 lb  01/14/

## 2019-04-23 ENCOUNTER — PATIENT MESSAGE (OUTPATIENT)
Dept: INTERNAL MEDICINE CLINIC | Facility: CLINIC | Age: 29
End: 2019-04-23

## 2019-04-23 NOTE — TELEPHONE ENCOUNTER
From: Roy Rubinstein  To: John Lopez MD  Sent: 4/23/2019 9:34 AM CDT  Subject: Visit Follow-up Question    Dr Aleksandra Loyd, I received an appointment confirmation for this Thursday at [address] [place] [address], apparently there was a miscommunication because it's in my Calendar for

## 2019-04-25 NOTE — TELEPHONE ENCOUNTER
Patient called office and spoke to me. Needed to cancel her appt that was for today at 1pm and find something else. I cancelled today's appt and then r/s her for 5/6 at 4pm. Patient was fine with waiting and stated that she would be ok on medications.  I ap

## 2019-05-06 ENCOUNTER — OFFICE VISIT (OUTPATIENT)
Dept: INTERNAL MEDICINE CLINIC | Facility: CLINIC | Age: 29
End: 2019-05-06
Payer: COMMERCIAL

## 2019-05-06 VITALS
HEIGHT: 66 IN | HEART RATE: 84 BPM | WEIGHT: 177 LBS | BODY MASS INDEX: 28.45 KG/M2 | RESPIRATION RATE: 16 BRPM | SYSTOLIC BLOOD PRESSURE: 118 MMHG | DIASTOLIC BLOOD PRESSURE: 78 MMHG

## 2019-05-06 DIAGNOSIS — Z51.81 ENCOUNTER FOR THERAPEUTIC DRUG MONITORING: Primary | ICD-10-CM

## 2019-05-06 DIAGNOSIS — E66.01 MORBID OBESITY WITH BMI OF 40.0-44.9, ADULT (HCC): ICD-10-CM

## 2019-05-06 PROCEDURE — 99213 OFFICE O/P EST LOW 20 MIN: CPT | Performed by: INTERNAL MEDICINE

## 2019-05-06 RX ORDER — PHENTERMINE HYDROCHLORIDE 37.5 MG/1
37.5 TABLET ORAL
Qty: 30 TABLET | Refills: 0 | Status: SHIPPED | OUTPATIENT
Start: 2019-05-06 | End: 2019-06-12

## 2019-05-06 NOTE — PROGRESS NOTES
HISTORY OF PRESENT ILLNESS  Patient presents with:  Weight Check: down 2 lb      dEna Ward is a 30year old female here for follow up in medical weight loss program.   Down 2 lb from previous   She was ill this past month and had a severe sinus infe 103 04/04/2017    HDL 39 (L) 04/04/2017     04/04/2017     Lab Results   Component Value Date    TSH 1.990 04/04/2017     Lab Results   Component Value Date    B12 868 04/04/2017     Lab Results   Component Value Date    VITD 35.82 07/31/2017 understanding.     Filemon Escobar MD  4/3/2018

## 2019-06-12 ENCOUNTER — OFFICE VISIT (OUTPATIENT)
Dept: INTERNAL MEDICINE CLINIC | Facility: CLINIC | Age: 29
End: 2019-06-12
Payer: COMMERCIAL

## 2019-06-12 VITALS
HEART RATE: 78 BPM | WEIGHT: 175 LBS | BODY MASS INDEX: 28.13 KG/M2 | SYSTOLIC BLOOD PRESSURE: 122 MMHG | DIASTOLIC BLOOD PRESSURE: 78 MMHG | HEIGHT: 66 IN | RESPIRATION RATE: 16 BRPM

## 2019-06-12 DIAGNOSIS — Z51.81 ENCOUNTER FOR THERAPEUTIC DRUG MONITORING: Primary | ICD-10-CM

## 2019-06-12 DIAGNOSIS — E66.01 MORBID OBESITY WITH BMI OF 40.0-44.9, ADULT (HCC): ICD-10-CM

## 2019-06-12 PROCEDURE — 99213 OFFICE O/P EST LOW 20 MIN: CPT | Performed by: INTERNAL MEDICINE

## 2019-06-12 RX ORDER — PHENTERMINE HYDROCHLORIDE 37.5 MG/1
37.5 TABLET ORAL
Qty: 30 TABLET | Refills: 1 | Status: SHIPPED | OUTPATIENT
Start: 2019-06-12 | End: 2019-09-16

## 2019-06-12 RX ORDER — PHENTERMINE HYDROCHLORIDE 37.5 MG/1
37.5 TABLET ORAL
Qty: 30 TABLET | Refills: 0 | Status: SHIPPED | OUTPATIENT
Start: 2019-06-12 | End: 2019-06-12

## 2019-06-12 NOTE — PROGRESS NOTES
HISTORY OF PRESENT ILLNESS  Patient presents with:  Weight Check: down 2 lb      Roxane Bell is a 30year old female here for follow up in medical weight loss program.   Down 2 lb from previous   Feels back on track this month  Doing a weight training Results   Component Value Date    B12 868 04/04/2017     Lab Results   Component Value Date    VITD 35.82 07/31/2017         Current Outpatient Medications on File Prior to Visit:  Probiotic Product (VSL#3) Oral Cap Take 1 capsule by mouth daily.  Disp: 60 weight loss and weight maintenance  · FITTE: ACSM recommendations (150-300 minutes/ week in active weight loss)  · Weight Loss Contract reviewed and signed     There are no Patient Instructions on file for this visit. Return for weight management.     Pa

## 2019-09-16 ENCOUNTER — OFFICE VISIT (OUTPATIENT)
Dept: INTERNAL MEDICINE CLINIC | Facility: CLINIC | Age: 29
End: 2019-09-16
Payer: COMMERCIAL

## 2019-09-16 VITALS
WEIGHT: 177 LBS | BODY MASS INDEX: 28.45 KG/M2 | HEART RATE: 74 BPM | HEIGHT: 66 IN | DIASTOLIC BLOOD PRESSURE: 68 MMHG | RESPIRATION RATE: 16 BRPM | SYSTOLIC BLOOD PRESSURE: 118 MMHG

## 2019-09-16 DIAGNOSIS — E66.01 MORBID OBESITY WITH BMI OF 40.0-44.9, ADULT (HCC): ICD-10-CM

## 2019-09-16 DIAGNOSIS — Z51.81 ENCOUNTER FOR THERAPEUTIC DRUG MONITORING: Primary | ICD-10-CM

## 2019-09-16 PROCEDURE — 99213 OFFICE O/P EST LOW 20 MIN: CPT | Performed by: INTERNAL MEDICINE

## 2019-09-16 RX ORDER — PHENTERMINE HYDROCHLORIDE 37.5 MG/1
37.5 TABLET ORAL
Qty: 30 TABLET | Refills: 1 | Status: SHIPPED | OUTPATIENT
Start: 2019-09-16 | End: 2019-12-16

## 2019-09-16 NOTE — PROGRESS NOTES
HISTORY OF PRESENT ILLNESS  Patient presents with:  Weight Check: up 2 lb      Mele Perez is a 30year old female here for follow up in medical weight loss program.     Up 2 lb from previous   Joined [employer]   Has been down addl 4 percent Lab Results   Component Value Date    TSH 1.990 04/04/2017     Lab Results   Component Value Date    B12 868 04/04/2017     Lab Results   Component Value Date    VITD 35.82 07/31/2017         Current Outpatient Medications on File Prior to Visit:  Prob Instructions on file for this visit. Return in about 8 weeks (around 11/11/2019). Patient verbalizes understanding.     Hailey Hua MD  4/3/2018

## 2019-10-15 ENCOUNTER — HOSPITAL ENCOUNTER (OUTPATIENT)
Age: 29
Discharge: HOME | End: 2019-10-15
Attending: FAMILY MEDICINE
Payer: COMMERCIAL

## 2019-10-15 VITALS
RESPIRATION RATE: 18 BRPM | TEMPERATURE: 99 F | HEART RATE: 98 BPM | SYSTOLIC BLOOD PRESSURE: 131 MMHG | OXYGEN SATURATION: 100 % | DIASTOLIC BLOOD PRESSURE: 72 MMHG

## 2019-10-15 DIAGNOSIS — H66.90 ACUTE OTITIS MEDIA, UNSPECIFIED OTITIS MEDIA TYPE: ICD-10-CM

## 2019-10-15 DIAGNOSIS — J02.9 ACUTE PHARYNGITIS, UNSPECIFIED ETIOLOGY: Primary | ICD-10-CM

## 2019-10-15 PROCEDURE — 99214 OFFICE O/P EST MOD 30 MIN: CPT

## 2019-10-15 PROCEDURE — 99213 OFFICE O/P EST LOW 20 MIN: CPT

## 2019-10-15 RX ORDER — AMOXICILLIN AND CLAVULANATE POTASSIUM 875; 125 MG/1; MG/1
1 TABLET, FILM COATED ORAL 2 TIMES DAILY
Qty: 20 TABLET | Refills: 0 | Status: SHIPPED | OUTPATIENT
Start: 2019-10-15 | End: 2019-10-25

## 2019-10-15 RX ORDER — IBUPROFEN 600 MG/1
600 TABLET ORAL EVERY 8 HOURS PRN
Qty: 30 TABLET | Refills: 0 | Status: SHIPPED | OUTPATIENT
Start: 2019-10-15 | End: 2019-10-22

## 2019-10-15 NOTE — ED PROVIDER NOTES
Patient Seen in: [facility]      History   Patient presents with:  Sinus Problem  Ear Pain  Sore Throat    Stated Complaint: 1 week sinus issues,head cold,sore throat,ear pain    HPI    38-year-old female presents with worsening Pulse 98   Temp 99.3 °F (37.4 °C) (Temporal)   Resp 18   LMP 09/26/2019 (Exact Date)   SpO2 100%         Physical Exam    Patient is alert oriented ×3 in no acute distress   conjunctiva clear no icterus  Left ear tympanic membrane is partially erythematous

## 2019-12-16 ENCOUNTER — OFFICE VISIT (OUTPATIENT)
Dept: INTERNAL MEDICINE CLINIC | Facility: CLINIC | Age: 29
End: 2019-12-16
Payer: COMMERCIAL

## 2019-12-16 VITALS
HEIGHT: 66 IN | SYSTOLIC BLOOD PRESSURE: 122 MMHG | WEIGHT: 175 LBS | HEART RATE: 78 BPM | BODY MASS INDEX: 28.13 KG/M2 | RESPIRATION RATE: 16 BRPM | DIASTOLIC BLOOD PRESSURE: 78 MMHG

## 2019-12-16 DIAGNOSIS — E66.01 MORBID OBESITY WITH BMI OF 40.0-44.9, ADULT (HCC): ICD-10-CM

## 2019-12-16 DIAGNOSIS — Z51.81 ENCOUNTER FOR THERAPEUTIC DRUG MONITORING: Primary | ICD-10-CM

## 2019-12-16 DIAGNOSIS — R53.81 PHYSICAL DECONDITIONING: ICD-10-CM

## 2019-12-16 PROCEDURE — 99214 OFFICE O/P EST MOD 30 MIN: CPT | Performed by: INTERNAL MEDICINE

## 2019-12-16 RX ORDER — PHENTERMINE HYDROCHLORIDE 37.5 MG/1
37.5 TABLET ORAL
Qty: 30 TABLET | Refills: 1 | Status: CANCELLED | OUTPATIENT
Start: 2019-12-16

## 2019-12-16 RX ORDER — PHENTERMINE HYDROCHLORIDE 15 MG/1
15 CAPSULE ORAL EVERY MORNING
Qty: 30 CAPSULE | Refills: 2 | Status: SHIPPED | OUTPATIENT
Start: 2019-12-16 | End: 2020-04-02

## 2019-12-16 NOTE — PROGRESS NOTES
HISTORY OF PRESENT ILLNESS  Patient presents with:  Weight Check: down 2 lb      Tari Scanlon is a 30year old female here for follow up in medical weight loss program.     Down 2 lb from previous   Continues to struggle with stressors in regards to ch Results   Component Value Date    CHOLEST 162 04/04/2017    TRIG 103 04/04/2017    HDL 39 (L) 04/04/2017     04/04/2017     Lab Results   Component Value Date    TSH 1.990 04/04/2017     Lab Results   Component Value Date    B12 868 04/04/2017     L recommended. · Discussed the role of sleep and stress in weight management. · Counseled on comprehensive weight loss plan including attention to nutrition, exercise and behavior/stress management for success.  See patient instruction below for more detail

## 2020-03-20 ENCOUNTER — PATIENT MESSAGE (OUTPATIENT)
Dept: INTERNAL MEDICINE CLINIC | Facility: CLINIC | Age: 30
End: 2020-03-20

## 2020-03-20 NOTE — TELEPHONE ENCOUNTER
From: Petra Wan  To: Allegra Villa MD  Sent: 3/20/2020 1:07 PM CDT  Subject: Visit Follow-up Question    Hello!  I am scheduled for follow up April 1st. 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

## 2020-04-02 ENCOUNTER — TELEPHONE (OUTPATIENT)
Dept: INTERNAL MEDICINE CLINIC | Facility: CLINIC | Age: 30
End: 2020-04-02

## 2020-04-02 ENCOUNTER — VIRTUAL PHONE E/M (OUTPATIENT)
Dept: INTERNAL MEDICINE CLINIC | Facility: CLINIC | Age: 30
End: 2020-04-02
Payer: COMMERCIAL

## 2020-04-02 DIAGNOSIS — E66.01 MORBID OBESITY WITH BMI OF 40.0-44.9, ADULT (HCC): ICD-10-CM

## 2020-04-02 DIAGNOSIS — Z51.81 ENCOUNTER FOR THERAPEUTIC DRUG MONITORING: Primary | ICD-10-CM

## 2020-04-02 PROCEDURE — 99212 OFFICE O/P EST SF 10 MIN: CPT | Performed by: INTERNAL MEDICINE

## 2020-04-02 RX ORDER — PHENTERMINE HYDROCHLORIDE 15 MG/1
15 CAPSULE ORAL EVERY MORNING
Qty: 30 CAPSULE | Refills: 2 | Status: SHIPPED | OUTPATIENT
Start: 2020-04-02 | End: 2020-07-17 | Stop reason: DRUGHIGH

## 2020-04-02 NOTE — PROGRESS NOTES
[facility] check in/follow up encounter  Virtual/Telephone Check-In    Joselyn Lin verbally consents to a Virtual/Telephone Check-In service on 03/30/20.   Patient understands and accepts financial responsibility for any deductib

## 2020-07-17 ENCOUNTER — VIRTUAL PHONE E/M (OUTPATIENT)
Dept: INTERNAL MEDICINE CLINIC | Facility: CLINIC | Age: 30
End: 2020-07-17

## 2020-07-17 DIAGNOSIS — E66.01 MORBID OBESITY WITH BMI OF 40.0-44.9, ADULT (HCC): ICD-10-CM

## 2020-07-17 DIAGNOSIS — Z51.81 ENCOUNTER FOR THERAPEUTIC DRUG MONITORING: Primary | ICD-10-CM

## 2020-07-17 PROCEDURE — 99213 OFFICE O/P EST LOW 20 MIN: CPT | Performed by: PHYSICIAN ASSISTANT

## 2020-07-17 RX ORDER — PHENTERMINE HYDROCHLORIDE 37.5 MG/1
37.5 TABLET ORAL
Qty: 30 TABLET | Refills: 1 | Status: SHIPPED | OUTPATIENT
Start: 2020-07-17 | End: 2020-12-22

## 2020-07-17 NOTE — PROGRESS NOTES
Virtual Telephone Check-In    Cristóbal Guajardo verbally consents to a Virtual/Telephone Check-In visit on 7/17/2020. Patient understands and accepts financial responsibility for any deductible, co-insurance and/or co-pays associated with this service. process   Patient speaking in full sentences, no increased work of breathing    Assessment/Plan:   Diagnoses and all orders for this visit:    Encounter for therapeutic drug monitoring    Morbid obesity with BMI of 40.0-44.9, adult ([facility] [address]    Other orders  -

## 2020-10-01 ENCOUNTER — TELEMEDICINE (OUTPATIENT)
Dept: INTERNAL MEDICINE CLINIC | Facility: CLINIC | Age: 30
End: 2020-10-01

## 2020-10-01 DIAGNOSIS — Z51.81 ENCOUNTER FOR THERAPEUTIC DRUG MONITORING: Primary | ICD-10-CM

## 2020-10-01 DIAGNOSIS — E66.01 MORBID OBESITY WITH BMI OF 40.0-44.9, ADULT (HCC): ICD-10-CM

## 2020-10-01 PROCEDURE — 99213 OFFICE O/P EST LOW 20 MIN: CPT | Performed by: INTERNAL MEDICINE

## 2020-10-01 RX ORDER — PHENTERMINE HYDROCHLORIDE 37.5 MG/1
37.5 TABLET ORAL
Qty: 30 TABLET | Refills: 0 | Status: SHIPPED | OUTPATIENT
Start: 2020-10-01 | End: 2020-12-22

## 2020-10-01 NOTE — PROGRESS NOTES
VIRTUAL ENCOUNTER     Kiersten Offer verbally consents to a Virtual/Telephone Check-In service on 10/01/20    HISTORY OF PRESENT ILLNESS  Rande Offer is a 26year old female is being evaluated as a video visit in our medical weight loss program. 04/04/2017     04/04/2017    K 4.2 04/04/2017     04/04/2017    CO2 26.3 04/04/2017     No results found for: EAG, A1C  Lab Results   Component Value Date    CHOLEST 162 04/04/2017    TRIG 103 04/04/2017    HDL 39 (L) 04/04/2017     04/0 visit. No follow-ups on file. Patient verbalizes understanding. Satnam Harris MD  10/1/2020         Pt understands phone/video evaluation is not a substitute for face to face examination or emergency care.  Pt advised to go to the ER or call 911 for w

## 2020-11-13 ENCOUNTER — TELEPHONE (OUTPATIENT)
Dept: INTERNAL MEDICINE CLINIC | Facility: HOSPITAL | Age: 30
End: 2020-11-13

## 2020-11-13 ENCOUNTER — LAB ENCOUNTER (OUTPATIENT)
Dept: LAB | Age: 30
End: 2020-11-13
Attending: PREVENTIVE MEDICINE
Payer: COMMERCIAL

## 2020-11-13 DIAGNOSIS — Z20.822 SUSPECTED 2019 NOVEL CORONAVIRUS INFECTION: ICD-10-CM

## 2020-11-13 DIAGNOSIS — Z20.822 SUSPECTED 2019 NOVEL CORONAVIRUS INFECTION: Primary | ICD-10-CM

## 2020-11-16 ENCOUNTER — TELEPHONE (OUTPATIENT)
Dept: INTERNAL MEDICINE CLINIC | Facility: HOSPITAL | Age: 30
End: 2020-11-16

## 2020-11-16 DIAGNOSIS — Z20.822 SUSPECTED 2019 NOVEL CORONAVIRUS INFECTION: Primary | ICD-10-CM

## 2020-11-18 ENCOUNTER — APPOINTMENT (OUTPATIENT)
Dept: LAB | Facility: HOSPITAL | Age: 30
End: 2020-11-18
Attending: PREVENTIVE MEDICINE

## 2020-11-18 DIAGNOSIS — Z20.822 SUSPECTED 2019 NOVEL CORONAVIRUS INFECTION: ICD-10-CM

## 2020-11-19 NOTE — PROGRESS NOTES
Juan Neither given rapid covid test results,states continues to have a HA,cough and fatigue.   noted

## 2020-11-20 ENCOUNTER — PATIENT MESSAGE (OUTPATIENT)
Dept: INTERNAL MEDICINE CLINIC | Facility: CLINIC | Age: 30
End: 2020-11-20

## 2020-11-20 NOTE — TELEPHONE ENCOUNTER
From: Roy Rubinstein  To: Zeny Rivera MD  Sent: 11/20/2020 10:40 AM CST  Subject: Visit Follow-up Question    Hello, I will be due for my follow up soon here.  I know it is supposed to be in office, but given the current situation I am concerned about this

## 2020-11-24 ENCOUNTER — TELEPHONE (OUTPATIENT)
Dept: INTERNAL MEDICINE CLINIC | Facility: HOSPITAL | Age: 30
End: 2020-11-24

## 2020-11-24 NOTE — TELEPHONE ENCOUNTER
Results and RTW guidelines:    COVID RESULT GIVEN:    []POSITIVE     [x] NEGATIVE     Result reviewed with Dr. Abdi Berrios and requests COVID hotline to f/u   Notes:  Derrel Osler reports she is feeling well and returned to work on 11/21/20.     [x]

## 2020-12-22 ENCOUNTER — TELEMEDICINE (OUTPATIENT)
Dept: INTERNAL MEDICINE CLINIC | Facility: CLINIC | Age: 30
End: 2020-12-22

## 2020-12-22 DIAGNOSIS — Z51.81 ENCOUNTER FOR THERAPEUTIC DRUG MONITORING: Primary | ICD-10-CM

## 2020-12-22 DIAGNOSIS — E66.01 MORBID OBESITY WITH BMI OF 40.0-44.9, ADULT (HCC): ICD-10-CM

## 2020-12-22 PROCEDURE — 99213 OFFICE O/P EST LOW 20 MIN: CPT | Performed by: INTERNAL MEDICINE

## 2020-12-22 RX ORDER — PHENTERMINE HYDROCHLORIDE 8 MG/1
8 TABLET ORAL DAILY
Qty: 30 TABLET | Refills: 2 | Status: SHIPPED | OUTPATIENT
Start: 2020-12-22 | End: 2021-01-21

## 2020-12-22 NOTE — PROGRESS NOTES
VIRTUAL ENCOUNTER     Teresa Turner verbally consents to a Virtual/Telephone Check-In service on 10/01/20    HISTORY OF PRESENT ILLNESS  Teresa Turner is a 26year old female is being evaluated as a video visit in our medical weight loss program. CO2 26.3 04/04/2017     No results found for: EAG, A1C  Lab Results   Component Value Date    CHOLEST 162 04/04/2017    TRIG 103 04/04/2017    HDL 39 (L) 04/04/2017     04/04/2017     Lab Results   Component Value Date    TSH 1.990 04/04/2017     La active weight loss)       There are no Patient Instructions on file for this visit. No follow-ups on file. Patient verbalizes understanding.     Nereida Jackson MD           Pt understands phone/video evaluation is not a substitute for face to face examina

## 2021-03-04 ENCOUNTER — TELEPHONE (OUTPATIENT)
Dept: INTERNAL MEDICINE CLINIC | Facility: CLINIC | Age: 31
End: 2021-03-04

## 2021-03-04 NOTE — TELEPHONE ENCOUNTER
Called and left message for pt to call back, need to r.s. appt for 3/25 [facility] dr Starleen Fleischer

## 2021-03-09 ENCOUNTER — PATIENT MESSAGE (OUTPATIENT)
Dept: INTERNAL MEDICINE CLINIC | Facility: CLINIC | Age: 31
End: 2021-03-09

## 2021-03-09 ENCOUNTER — TELEMEDICINE (OUTPATIENT)
Dept: INTERNAL MEDICINE CLINIC | Facility: CLINIC | Age: 31
End: 2021-03-09
Payer: COMMERCIAL

## 2021-03-09 DIAGNOSIS — F50.81 BINGE EATING DISORDER: ICD-10-CM

## 2021-03-09 DIAGNOSIS — Z51.81 ENCOUNTER FOR THERAPEUTIC DRUG MONITORING: Primary | ICD-10-CM

## 2021-03-09 DIAGNOSIS — E66.01 MORBID OBESITY WITH BMI OF 40.0-44.9, ADULT (HCC): ICD-10-CM

## 2021-03-09 PROCEDURE — 99213 OFFICE O/P EST LOW 20 MIN: CPT | Performed by: INTERNAL MEDICINE

## 2021-03-09 NOTE — TELEPHONE ENCOUNTER
From: Isai Jensen  To: Jaqueline Becerra MD  Sent: 3/9/2021 11:04 AM CST  Subject: Visit Follow-up Question    I have been attempting for 15 minutes to get on video visit and it is saying cannot connect. I have used before with no issues.  I have also called

## 2021-03-09 NOTE — PROGRESS NOTES
VIRTUAL ENCOUNTER     Cecy Thornton verbally consents to a Virtual/Telephone Check-In service on 10/01/20    HISTORY OF PRESENT ILLNESS  Cecy Thornton is a 26year old female is being evaluated as a video visit in our medical weight loss program. CO2 26.3 04/04/2017     No results found for: EAG, A1C  Lab Results   Component Value Date    CHOLEST 162 04/04/2017    TRIG 103 04/04/2017    HDL 39 (L) 04/04/2017     04/04/2017     Lab Results   Component Value Date    TSH 1.990 04/04/2017     La and behavior/stress management for success. See patient instruction below for more details.   · Discussed strategies to overcome barriers to successful weight loss and weight maintenance  · FITTE: ACSM recommendations (150-300 minutes/ week in active weight

## 2021-04-15 ENCOUNTER — TELEMEDICINE (OUTPATIENT)
Dept: INTERNAL MEDICINE CLINIC | Facility: CLINIC | Age: 31
End: 2021-04-15

## 2021-04-15 DIAGNOSIS — F50.81 BINGE EATING DISORDER: ICD-10-CM

## 2021-04-15 DIAGNOSIS — Z51.81 ENCOUNTER FOR THERAPEUTIC DRUG MONITORING: Primary | ICD-10-CM

## 2021-04-15 DIAGNOSIS — E66.01 MORBID OBESITY WITH BMI OF 40.0-44.9, ADULT (HCC): ICD-10-CM

## 2021-04-15 PROCEDURE — 99213 OFFICE O/P EST LOW 20 MIN: CPT | Performed by: INTERNAL MEDICINE

## 2021-04-15 NOTE — PROGRESS NOTES
VIRTUAL ENCOUNTER     Hiren Miranda verbally consents to a Virtual/Telephone Check-In service on 10/01/20    HISTORY OF PRESENT ILLNESS  Hiren Miranda is a 26year old female is being evaluated as a video visit in our medical weight loss program. ALT 16 04/04/2017    BILT 0.58 04/04/2017    TP 7.2 04/04/2017    ALB 3.9 04/04/2017     04/04/2017    K 4.2 04/04/2017     04/04/2017    CO2 26.3 04/04/2017     No results found for: EAG, A1C  Lab Results   Component Value Date    CHOLEST 1 · Reviewed risk of continuing phentermine and data in regards to long term usage  · Follow up with dietitian and psychologist as recommended. · Discussed the role of sleep and stress in weight management.   · Counseled on comprehensive weight loss plan i

## 2021-06-17 ENCOUNTER — TELEMEDICINE (OUTPATIENT)
Dept: INTERNAL MEDICINE CLINIC | Facility: CLINIC | Age: 31
End: 2021-06-17

## 2021-06-17 DIAGNOSIS — E66.01 MORBID OBESITY WITH BMI OF 40.0-44.9, ADULT (HCC): ICD-10-CM

## 2021-06-17 DIAGNOSIS — F50.81 BINGE EATING DISORDER: ICD-10-CM

## 2021-06-17 DIAGNOSIS — Z51.81 ENCOUNTER FOR THERAPEUTIC DRUG MONITORING: Primary | ICD-10-CM

## 2021-06-17 PROCEDURE — 99213 OFFICE O/P EST LOW 20 MIN: CPT | Performed by: INTERNAL MEDICINE

## 2021-06-17 NOTE — PROGRESS NOTES
VIRTUAL ENCOUNTER     Mele Perez verbally consents to a Virtual/Telephone Check-In service on 10/01/20    HISTORY OF PRESENT ILLNESS  Mele Perez is a 33year old female is being evaluated as a video visit in our medical weight loss program. 04/04/2017    BILT 0.58 04/04/2017    TP 7.2 04/04/2017    ALB 3.9 04/04/2017     04/04/2017    K 4.2 04/04/2017     04/04/2017    CO2 26.3 04/04/2017     No results found for: EAG, A1C  Lab Results   Component Value Date    CHOLEST 162 04/04/2 behavior/stress management for success. See patient instruction below for more details.   · Discussed strategies to overcome barriers to successful weight loss and weight maintenance  · FITTE: ACSM recommendations (150-300 minutes/ week in active weight los

## 2021-06-18 ENCOUNTER — PATIENT MESSAGE (OUTPATIENT)
Dept: INTERNAL MEDICINE CLINIC | Facility: CLINIC | Age: 31
End: 2021-06-18

## 2021-06-20 NOTE — TELEPHONE ENCOUNTER
From: Warden Albert  To: Zhane Qiu MD  Sent: 6/18/2021 4:52 PM CDT  Subject: Visit Follow-up Question    Hello! I had my visit yesterday and have a couple questions. I don't see a prescription sent to my pharmacy for the Vyvanse.  Also, I know last time

## 2021-06-20 NOTE — TELEPHONE ENCOUNTER
Patient had telemedicine visit on 6/17/21 and it is noted:       PLAN  · Patient meets BEDS-7 criteria for binge eating disorder, answering yes to excessive to excessive eating, guilty about excessive eating, eating when not hungry, feeling guilty afterwar

## 2021-10-12 ENCOUNTER — TELEPHONE (OUTPATIENT)
Dept: INTERNAL MEDICINE CLINIC | Facility: CLINIC | Age: 31
End: 2021-10-12

## 2021-10-12 NOTE — TELEPHONE ENCOUNTER
Pt is calling in regards to a medication refill for Lisdexamfetamine Dimesylate (VYVANSE) 40 MG Oral Cap.  The pt has been scheduled for 12/4/2021
Requesting Vyvanse 40 mg  LOV: 6/17/21  RTC: one month  Last Relevant Labs: na  Filled: 8/21/21 #30 with 0 refills    Future Appointments   Date Time Provider Gal Rodríguez   12/4/2021 10:00 AM Luz Maria Mcmahon PA-C [address] [facility] [address]
02-Feb-2021 06:08

## 2021-12-04 ENCOUNTER — OFFICE VISIT (OUTPATIENT)
Dept: INTERNAL MEDICINE CLINIC | Facility: CLINIC | Age: 31
End: 2021-12-04
Payer: COMMERCIAL

## 2021-12-04 VITALS
BODY MASS INDEX: 31.02 KG/M2 | DIASTOLIC BLOOD PRESSURE: 60 MMHG | WEIGHT: 193 LBS | RESPIRATION RATE: 16 BRPM | SYSTOLIC BLOOD PRESSURE: 110 MMHG | HEIGHT: 66 IN | HEART RATE: 80 BPM

## 2021-12-04 DIAGNOSIS — Z51.81 ENCOUNTER FOR THERAPEUTIC DRUG MONITORING: Primary | ICD-10-CM

## 2021-12-04 DIAGNOSIS — F50.81 BINGE EATING DISORDER: ICD-10-CM

## 2021-12-04 DIAGNOSIS — E66.9 CLASS 1 OBESITY WITHOUT SERIOUS COMORBIDITY WITH BODY MASS INDEX (BMI) OF 31.0 TO 31.9 IN ADULT, UNSPECIFIED OBESITY TYPE: ICD-10-CM

## 2021-12-04 PROCEDURE — 3074F SYST BP LT 130 MM HG: CPT | Performed by: PHYSICIAN ASSISTANT

## 2021-12-04 PROCEDURE — 3008F BODY MASS INDEX DOCD: CPT | Performed by: PHYSICIAN ASSISTANT

## 2021-12-04 PROCEDURE — 3078F DIAST BP <80 MM HG: CPT | Performed by: PHYSICIAN ASSISTANT

## 2021-12-04 PROCEDURE — 99213 OFFICE O/P EST LOW 20 MIN: CPT | Performed by: PHYSICIAN ASSISTANT

## 2021-12-04 RX ORDER — PHENTERMINE HYDROCHLORIDE 37.5 MG/1
37.5 TABLET ORAL
Qty: 30 TABLET | Refills: 1 | Status: SHIPPED | OUTPATIENT
Start: 2021-12-04 | End: 2022-02-05

## 2021-12-04 NOTE — PROGRESS NOTES
HISTORY OF PRESENT ILLNESS  Patient presents with:  Weight Check: up 12 lbs       Mary Posey is a 33year old female here for follow up in medical weight loss program.   Up 12lbs  Was on vyvanse, not currently on any AOM  Denies chest pain, shortness 04/04/2017     04/04/2017     Lab Results   Component Value Date    GLU 88 04/04/2017    BUN 12 04/04/2017    CREATSERUM 0.64 04/04/2017    ALKPHO 87 04/04/2017    AST 12 (L) 04/04/2017    ALT 16 04/04/2017    BILT 0.58 04/04/2017    TP 7.2 04/04/20 contraindications: topamax  · Follow up with dietitian and psychologist as recommended. · Discussed the role of sleep and stress in weight management.   · Counseled on comprehensive weight loss plan including attention to nutrition, exercise and behavior/s

## 2021-12-04 NOTE — PATIENT INSTRUCTIONS
1400 calories a day    Moderate Carb (30/35/35)   108g   protein     56g   fats     126g   carbs   Lower Carb (40/40/20)   144g   protein     64g   fats     72g   carbs

## 2022-02-05 ENCOUNTER — OFFICE VISIT (OUTPATIENT)
Dept: INTERNAL MEDICINE CLINIC | Facility: CLINIC | Age: 32
End: 2022-02-05
Payer: COMMERCIAL

## 2022-02-05 VITALS
BODY MASS INDEX: 30.22 KG/M2 | HEART RATE: 84 BPM | SYSTOLIC BLOOD PRESSURE: 112 MMHG | DIASTOLIC BLOOD PRESSURE: 72 MMHG | HEIGHT: 66 IN | WEIGHT: 188 LBS | RESPIRATION RATE: 16 BRPM

## 2022-02-05 DIAGNOSIS — F50.81 BINGE EATING DISORDER: ICD-10-CM

## 2022-02-05 DIAGNOSIS — Z51.81 ENCOUNTER FOR THERAPEUTIC DRUG MONITORING: Primary | ICD-10-CM

## 2022-02-05 DIAGNOSIS — E66.9 CLASS 1 OBESITY WITHOUT SERIOUS COMORBIDITY WITH BODY MASS INDEX (BMI) OF 31.0 TO 31.9 IN ADULT, UNSPECIFIED OBESITY TYPE: ICD-10-CM

## 2022-02-05 PROCEDURE — 3008F BODY MASS INDEX DOCD: CPT | Performed by: PHYSICIAN ASSISTANT

## 2022-02-05 PROCEDURE — 3074F SYST BP LT 130 MM HG: CPT | Performed by: PHYSICIAN ASSISTANT

## 2022-02-05 PROCEDURE — 99213 OFFICE O/P EST LOW 20 MIN: CPT | Performed by: PHYSICIAN ASSISTANT

## 2022-02-05 PROCEDURE — 3078F DIAST BP <80 MM HG: CPT | Performed by: PHYSICIAN ASSISTANT

## 2022-02-05 RX ORDER — PHENTERMINE HYDROCHLORIDE 37.5 MG/1
37.5 TABLET ORAL
Qty: 30 TABLET | Refills: 1 | Status: SHIPPED | OUTPATIENT
Start: 2022-02-05 | End: 2022-04-02

## 2022-04-02 ENCOUNTER — OFFICE VISIT (OUTPATIENT)
Dept: INTERNAL MEDICINE CLINIC | Facility: CLINIC | Age: 32
End: 2022-04-02
Payer: COMMERCIAL

## 2022-04-02 VITALS
HEIGHT: 66 IN | BODY MASS INDEX: 29.89 KG/M2 | RESPIRATION RATE: 16 BRPM | DIASTOLIC BLOOD PRESSURE: 66 MMHG | WEIGHT: 186 LBS | SYSTOLIC BLOOD PRESSURE: 110 MMHG | HEART RATE: 86 BPM

## 2022-04-02 DIAGNOSIS — F50.81 BINGE EATING DISORDER: ICD-10-CM

## 2022-04-02 DIAGNOSIS — Z51.81 ENCOUNTER FOR THERAPEUTIC DRUG MONITORING: Primary | ICD-10-CM

## 2022-04-02 DIAGNOSIS — E66.9 CLASS 1 OBESITY WITHOUT SERIOUS COMORBIDITY WITH BODY MASS INDEX (BMI) OF 31.0 TO 31.9 IN ADULT, UNSPECIFIED OBESITY TYPE: ICD-10-CM

## 2022-04-02 PROCEDURE — 99213 OFFICE O/P EST LOW 20 MIN: CPT | Performed by: PHYSICIAN ASSISTANT

## 2022-04-02 PROCEDURE — 3078F DIAST BP <80 MM HG: CPT | Performed by: PHYSICIAN ASSISTANT

## 2022-04-02 PROCEDURE — 3008F BODY MASS INDEX DOCD: CPT | Performed by: PHYSICIAN ASSISTANT

## 2022-04-02 PROCEDURE — 3074F SYST BP LT 130 MM HG: CPT | Performed by: PHYSICIAN ASSISTANT

## 2022-04-02 RX ORDER — PHENTERMINE HYDROCHLORIDE 37.5 MG/1
37.5 TABLET ORAL
Qty: 30 TABLET | Refills: 1 | Status: SHIPPED | OUTPATIENT
Start: 2022-04-02

## 2022-08-06 ENCOUNTER — TELEMEDICINE (OUTPATIENT)
Dept: INTERNAL MEDICINE CLINIC | Facility: CLINIC | Age: 32
End: 2022-08-06
Payer: COMMERCIAL

## 2022-08-06 DIAGNOSIS — Z51.81 ENCOUNTER FOR THERAPEUTIC DRUG MONITORING: Primary | ICD-10-CM

## 2022-08-06 DIAGNOSIS — E66.9 OBESITY (BMI 30.0-34.9): ICD-10-CM

## 2022-08-06 DIAGNOSIS — F50.81 BINGE EATING DISORDER: ICD-10-CM

## 2022-08-06 RX ORDER — PHENTERMINE HYDROCHLORIDE 37.5 MG/1
37.5 TABLET ORAL
Qty: 30 TABLET | Refills: 2 | Status: SHIPPED | OUTPATIENT
Start: 2022-08-06

## 2022-08-06 RX ORDER — METFORMIN HYDROCHLORIDE 750 MG/1
750 TABLET, EXTENDED RELEASE ORAL DAILY
Qty: 90 TABLET | Refills: 0 | Status: SHIPPED | OUTPATIENT
Start: 2022-08-06

## 2022-10-14 ENCOUNTER — PATIENT MESSAGE (OUTPATIENT)
Dept: INTERNAL MEDICINE CLINIC | Facility: CLINIC | Age: 32
End: 2022-10-14

## 2022-10-14 NOTE — TELEPHONE ENCOUNTER
From: Jacqueline Gore  To: Chelle You PA-C  Sent: 10/14/2022 1:00 PM CDT  Subject: Follow up    Hello! I am currently on the wait list but as of now am unable to get a follow up appointment until January. Obviously I will run out of my medication. Joi Cabezas I am currently on the Phentermine and Metformin combo. I do understand if the phentermine cannot be refilled until my follow up but was wondering about the metformin? I have been doing well on them both with no side effects. Thank you!

## 2022-10-17 RX ORDER — METFORMIN HYDROCHLORIDE 750 MG/1
750 TABLET, EXTENDED RELEASE ORAL DAILY
Qty: 90 TABLET | Refills: 0 | Status: SHIPPED | OUTPATIENT
Start: 2022-10-17

## 2022-10-17 RX ORDER — PHENTERMINE HYDROCHLORIDE 37.5 MG/1
37.5 TABLET ORAL
Qty: 30 TABLET | Refills: 2 | Status: SHIPPED | OUTPATIENT
Start: 2022-10-17

## 2023-01-30 RX ORDER — PHENTERMINE HYDROCHLORIDE 37.5 MG/1
37.5 CAPSULE ORAL EVERY MORNING
Qty: 30 CAPSULE | Refills: 0 | Status: SHIPPED | OUTPATIENT
Start: 2023-01-30

## 2023-02-23 RX ORDER — METFORMIN HYDROCHLORIDE 750 MG/1
TABLET, EXTENDED RELEASE ORAL
Qty: 30 TABLET | Refills: 2 | OUTPATIENT
Start: 2023-02-23

## 2023-02-23 NOTE — TELEPHONE ENCOUNTER
Requesting metformin  mg  LOV: 8/6/22  RTC: not noted  Last Relevant Labs: no A1c  Filled: 10/17/22 #90 with 0 refills    Future Appointments   Date Time Provider Gal Rodríguez   4/1/2023  9:40 AM Darling Escobar PA-C [address] [facility] [address]   5/6/2023  9:40 AM Darling Escobar PA-C [address] [facility] [address]     Clinic cancelled 9/3/22 appt  Patient cancelled 1/7/23 appt    Over 6 months since since - denied refill

## 2023-04-01 ENCOUNTER — OFFICE VISIT (OUTPATIENT)
Dept: INTERNAL MEDICINE CLINIC | Facility: CLINIC | Age: 33
End: 2023-04-01
Payer: COMMERCIAL

## 2023-04-01 VITALS
WEIGHT: 186 LBS | BODY MASS INDEX: 29.89 KG/M2 | SYSTOLIC BLOOD PRESSURE: 128 MMHG | HEIGHT: 66 IN | DIASTOLIC BLOOD PRESSURE: 78 MMHG | HEART RATE: 78 BPM | RESPIRATION RATE: 16 BRPM

## 2023-04-01 DIAGNOSIS — E66.9 OBESITY (BMI 30.0-34.9): ICD-10-CM

## 2023-04-01 DIAGNOSIS — Z51.81 ENCOUNTER FOR THERAPEUTIC DRUG MONITORING: Primary | ICD-10-CM

## 2023-04-01 PROCEDURE — 3074F SYST BP LT 130 MM HG: CPT | Performed by: PHYSICIAN ASSISTANT

## 2023-04-01 PROCEDURE — 3008F BODY MASS INDEX DOCD: CPT | Performed by: PHYSICIAN ASSISTANT

## 2023-04-01 PROCEDURE — 3078F DIAST BP <80 MM HG: CPT | Performed by: PHYSICIAN ASSISTANT

## 2023-04-01 PROCEDURE — 99213 OFFICE O/P EST LOW 20 MIN: CPT | Performed by: PHYSICIAN ASSISTANT

## 2023-04-01 RX ORDER — METFORMIN HYDROCHLORIDE 750 MG/1
750 TABLET, EXTENDED RELEASE ORAL DAILY
Qty: 90 TABLET | Refills: 1 | Status: SHIPPED | OUTPATIENT
Start: 2023-04-01

## 2023-10-16 RX ORDER — METFORMIN HYDROCHLORIDE 750 MG/1
750 TABLET, EXTENDED RELEASE ORAL DAILY
Qty: 30 TABLET | Refills: 5 | OUTPATIENT
Start: 2023-10-16

## 2023-10-16 NOTE — TELEPHONE ENCOUNTER
Requesting   Requested Prescriptions     Pending Prescriptions Disp Refills    METFORMIN  MG Oral Tablet 24 Hr [Pharmacy Med Name: METFORMIN HCL  MG TABLET] 30 tablet 5     Sig: TAKE 1 TABLET BY MOUTH EVERY DAY     LOV: 4/1/23  RTC: notnoted  Filled: 4/1/23 #90 with 1 refills    No future appointments.   Needs appt

## 2024-05-29 NOTE — TELEPHONE ENCOUNTER
Requesting topiramate 25 MG Oral Tab  LOV: 05/09/2018  RTC: 1 month  Filled: 05/09/2018 #180 with 0 refills    Future Appointments  Date Time Provider Gal Rodríguez   7/2/2018 1:20 PM Dinh Lawrence MD [facility] [address]
History of DVT in adulthood
